# Patient Record
Sex: MALE
[De-identification: names, ages, dates, MRNs, and addresses within clinical notes are randomized per-mention and may not be internally consistent; named-entity substitution may affect disease eponyms.]

---

## 2018-08-04 ENCOUNTER — HOSPITAL ENCOUNTER (EMERGENCY)
Dept: HOSPITAL 12 - ER | Age: 28
Discharge: LEFT BEFORE DISCHARGE | End: 2018-08-04
Payer: SELF-PAY

## 2018-08-04 VITALS — HEIGHT: 71 IN | BODY MASS INDEX: 30.8 KG/M2 | WEIGHT: 220 LBS
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
## 2018-08-04 DIAGNOSIS — S00.03XA: ICD-10-CM
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
## 2018-08-04 DIAGNOSIS — S00.01XA: Primary | ICD-10-CM
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
## 2018-08-04 PROCEDURE — A4663 DIALYSIS BLOOD PRESSURE CUFF: HCPCS

## 2018-08-04 NOTE — NUR
CALLED [employer] AND SPOKE WITH  192 AND REPORTED INCIDENT TO THEM. CASE 
#[id number]. DISPATCH WILL SEND PD UNIT TO INTERVIEW PATIENT

## 2018-08-04 NOTE — NUR
Patient eloped from facility.  ER physician notified. Pt last seen [phone], said 
they just wanted to speak with friend outside.